# Patient Record
(demographics unavailable — no encounter records)

---

## 2024-10-19 NOTE — HISTORY OF PRESENT ILLNESS
[FreeTextEntry1] : Patient presents today with continued left midfoot pain.  She has had multiple injections in the area, she gets relief but then it recurs.  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  No new changes to her medical status.

## 2024-10-19 NOTE — ASSESSMENT
[FreeTextEntry1] : Impression: Bone spur, left foot (M77.52).  Chronic pain, left foot (M79.672).  Ganglion (M67.40). At this time, the patient has had multiple injections and I do not want to continue injecting her without a reasonable diagnosis as to why she is getting the pain.  I feel that a repeat MRI with contrast is necessary for that.    Treatment: She was given a Medrol dose pack.  Will reevaluate upon completion of the MRI with contrast.  Any questions or problems, patient is to contact the office.

## 2024-10-19 NOTE — PHYSICAL EXAM
[1+] : left foot dorsalis pedis 1+ [Skin Color & Pigmentation] : normal skin color and pigmentation [Skin Turgor] : normal skin turgor [Skin Lesions] : no skin lesions [Sensation] : the sensory exam was normal to light touch and pinprick [No Focal Deficits] : no focal deficits [Deep Tendon Reflexes (DTR)] : deep tendon reflexes were 2+ and symmetric [Motor Exam] : the motor exam was normal [General Appearance - Alert] : alert [Oriented To Time, Place, And Person] : oriented to person, place, and time [Ankle Swelling (On Exam)] : not present [Varicose Veins Of Lower Extremities] : not present [] : not present [Delayed in the Right Toes] : capillary refills normal in right toes [Delayed in the Left Toes] : capillary refills normal in the left toes [Wicho's Test For Radial Artery Patency Bilaterally] : negative bilateral [de-identified] : Pain at the left midfoot at the tarsometatarsal joint, left. [Vibration Dec.] : normal vibratory sensation at the level of the toes [Position Sense Dec.] : normal position sense at the level of the toes

## 2024-10-19 NOTE — PHYSICAL EXAM
[1+] : left foot dorsalis pedis 1+ [Skin Color & Pigmentation] : normal skin color and pigmentation [Skin Turgor] : normal skin turgor [Skin Lesions] : no skin lesions [Sensation] : the sensory exam was normal to light touch and pinprick [No Focal Deficits] : no focal deficits [Deep Tendon Reflexes (DTR)] : deep tendon reflexes were 2+ and symmetric [Motor Exam] : the motor exam was normal [General Appearance - Alert] : alert [Oriented To Time, Place, And Person] : oriented to person, place, and time [Ankle Swelling (On Exam)] : not present [Varicose Veins Of Lower Extremities] : not present [] : not present [Delayed in the Right Toes] : capillary refills normal in right toes [Delayed in the Left Toes] : capillary refills normal in the left toes [Wicho's Test For Radial Artery Patency Bilaterally] : negative bilateral [de-identified] : Pain at the left midfoot at the tarsometatarsal joint, left. [Vibration Dec.] : normal vibratory sensation at the level of the toes [Position Sense Dec.] : normal position sense at the level of the toes

## 2024-10-19 NOTE — PROCEDURE
[FreeTextEntry1] : X-rays were taken to evaluate the foot. (3 views - left foot) No area of fracture or dislocation.  There is no obvious bone spur.

## 2024-10-19 NOTE — PHYSICAL EXAM
[1+] : left foot dorsalis pedis 1+ [Skin Color & Pigmentation] : normal skin color and pigmentation [Skin Turgor] : normal skin turgor [Skin Lesions] : no skin lesions [Sensation] : the sensory exam was normal to light touch and pinprick [No Focal Deficits] : no focal deficits [Deep Tendon Reflexes (DTR)] : deep tendon reflexes were 2+ and symmetric [Motor Exam] : the motor exam was normal [General Appearance - Alert] : alert [Oriented To Time, Place, And Person] : oriented to person, place, and time [Ankle Swelling (On Exam)] : not present [Varicose Veins Of Lower Extremities] : not present [] : not present [Delayed in the Right Toes] : capillary refills normal in right toes [Delayed in the Left Toes] : capillary refills normal in the left toes [Wicho's Test For Radial Artery Patency Bilaterally] : negative bilateral [de-identified] : Pain at the left midfoot at the tarsometatarsal joint, left. [Vibration Dec.] : normal vibratory sensation at the level of the toes [Position Sense Dec.] : normal position sense at the level of the toes

## 2024-10-31 NOTE — DISCUSSION/SUMMARY
[FreeTextEntry1] : 23yo G0- new pt to establish care  # PCOS hx - vague hx - states diagnosed @19yo (for 21day cycles/ acne)- has been on Metformin and Spirinolactone  - pt with h/o thyroid dysfunction predating (17yo)??  - []  testosterone / SBGH   # h/o thrombophilia - discussed with pt important medical history- [ ]need records from blood work with prior gyn  Pt signed record release - if unable to get- would refer to hematology .  Discussed long term implications when deciding contraception choices as well as obstetrical care.   # HCM - [ ]pap collected,  [ ]GC/CT collected - contraception- declined, not sex active, with mostly regular cycles (except when thryoid meds adjusted).  Reviewed Progesterone only options/non hormonal options until thrombophila diagnosis confirmed - Gardasil completed per pt - PHQ9 reviewed- pt scored 11, has h/o depression/anxiety- has been trying to shedule w/ psych/  Denies SI/HI.  SW to meet w/ pt today to try and expedite - Gen health followed by med and endocrine  RTC for josef/lucius Arriaga, PAC

## 2024-10-31 NOTE — PHYSICAL EXAM
[Appropriately responsive] : appropriately responsive [Alert] : alert [No Acute Distress] : no acute distress [No Lymphadenopathy] : no lymphadenopathy [Soft] : soft [Non-tender] : non-tender [Non-distended] : non-distended [No HSM] : No HSM [No Lesions] : no lesions [No Mass] : no mass [Oriented x3] : oriented x3 [Examination Of The Breasts] : a normal appearance [No Masses] : no breast masses were palpable [Labia Majora] : normal [Labia Minora] : normal [No Bleeding] : There was no active vaginal bleeding [Normal] : normal [Normal Position] : in a normal position [Tenderness] : nontender [Enlarged ___ wks] : not enlarged [Uterine Adnexae] : normal

## 2024-10-31 NOTE — HISTORY OF PRESENT ILLNESS
[FreeTextEntry1] : 23yo G0 (LMP 10/11/24) with h/o hyperthyroid dx @15yo (on methimazole) , PCOS dx @17yo (on Metformin 500mg qd and spironolactone), Thrombophilia (pt unsure which condition, dx after mom had multiple blood clots and found to have hereditary "clotting" condition) presents as new pt to establish care.    Pt reports regular menses q month- unless adjustment to thyroid rx- pt states she experienced prolonged menses x 30+ days while readjusting to dosage.   Pt is not sexually active  (has been in the past/2 life partners- tried paragard in 2021 but prior gyn told her it was "falling out" so he removed it).  - Gynhx: never had pap. denies stds.   +PCoS (dx based on 21 day cycles as a teenager and acne per pt), completed gardasil - Pmhx: hyperthyroid  (followed by endocrine [facility] at [facility]), thrombophilia ??, depression/anxiety (no meds, awaiting psych appt) - Shx: denies - Meds: methimazole, spirinolactone, metformin - ALL: NKDA - SOchx: denies tob/etoh/drugs   denies dv or abuse issues/  [employer] student- studying law - FAmhx: mom: thyoid/ PCOS/ thrombophilia   Gen health followed by medicine and endocrine PHQ9: 11 (denies SI/HI)       GYnhx: menarche 11yo,

## 2025-01-13 NOTE — DISCUSSION/SUMMARY
[FreeTextEntry1] : 25yo G0 with early diagnosis of hyperthyroidism (followed by endocrine- on methimazole since 15yo) and PCOS (on metformin and spirinolactone since 17yo)- presents for follow up.  Desires trial off meds  - reviewed pt chart/ labs.  - agree with trial off metformin and spironolactone - pt to keep menstrual diary.   Discussed role of POP if dysmenorrhea becomes an issue (was pt primary complaint as a teen) - [ ]derm referral for acne scarring treatment options.  - will defer to endocrine re: Methimazole.    # thrombophilia - no actual records in chart (2018 notation re: verbal from [facility] with decreased antithrombin 3) - [ ] rpt thrombophilia panel- f/u based on result  RTC 3 months for follow up Minerva Arriaga PAC

## 2025-01-13 NOTE — HISTORY OF PRESENT ILLNESS
[FreeTextEntry1] : 25yo G0 (LMP 12/31/24) with h/o hyperthyroid dx @17yo (on methimazole) , PCOS dx @19yo (on Metformin 500mg qd and spironolactone), Thrombophilia (pt unsure which condition, dx after mom had multiple blood clots and found to have hereditary "clotting" condition) presents as follow up with outside records for review.   Pt seen last month- inquiring if she needs to be on metformin and spirinolactone.  Pt has been taking x 6yrs.   Reports acne has resolved, has been dealing with scars only for a long time.  Cystic acne resolved after pt discontinued all dairy (lactose intolerant).  Pt states her periods were very heavy as a teen and attributed to PCOS.  REcords reviewed ( per PA note, antithrombin 3 deficiency - verbal from [facility].  no documentation) Pt states she was sent to heme years ago who did not seem concerned, but advised against TRUONG

## 2025-01-27 NOTE — PHYSICAL EXAM
[de-identified] : Left foot Physical Examination:  General: Alert and oriented x3.  In no acute distress.  Pleasant in nature with a normal affect.  No apparent respiratory distress.  Erythema, Warmth, Rubor: Negative Swelling: Negative  ROM Ankle: 1. Dorsiflexion: 10 degrees 2. Plantarflexion: 40 degrees 3. Inversion: 30 degrees 4. Eversion: 20 degrees  ROM of digits: Normal  Pes Planus: Negative Pes Cavus: Negative  Bunion: Negative Tailor's Bunion (Bunionette): Negative Hammer Toe Deformity/Deformities: Negative  Tenderness to Palpation:  1. Heel Pain: Negative 2. Midfoot Pain: + 3. First MTP Joint: Negative 4. Lis Franc Joint: +  Tenderness Metatarsals: 1st MT: Negative 2nd MT: Negative 3rd MT: Negative 4th MT: Negative 5th MT: Negative Base of the 5th MT: Negative  Ligament Pain: 1. Lis Franc Ligament: Negative 2. Plantar Fascia Ligament: Negative  Strength:  5/5 TA/GS/EHL/FHL/EDL/ADD/ABD  Pulses: 2+ DP/PT Pulses  Capillary Refill Toes: <2 seconds  Neuro: Intact motor and sensory throughout  Additional Test: 1. Khan's Squeeze Test: Negative 2. Calcaneal Squeeze Test: Negative [de-identified] : 3 views x-rays left foot reviewed and taken on 1/27/2025: No fractures or abnormality seen.    Radiology imaging reviewed in office with the patient on 01/27/2025 and I agree with the radiologist's impression below.  MRI of the left foot obtained from [facility] on 03/7/2021 and reviewed in the office today, 01/27/2025, revealed:  Findings most suggestive of a multifocal/lobulated ganglion along the dorsal fibers of the Lisfranc ligament overall measuring up to 1.2 cm. Dorsal spurring at the second tarsometatarsal articulation.

## 2025-01-27 NOTE — HISTORY OF PRESENT ILLNESS
[FreeTextEntry1] : 01/27/2025: JAQUELIN KHALIL is a 24-year-old female presenting to the office for an initial evaluation of left foot pain. She reports that the pain began is chronic.  Last cortisone injection 2024, stating that she had since she was 17 about 20 cortisone injections from a podiatrist into the midfoot region of her left foot.  The patient is concerned because each injection gave her temporary relief and she still has pain.  The patient is here for a second opinion.  She is in regular sneakers, walking without assistance.  Pain with walking, 6 out of 10.  The foot is limiting her from physical activities.  No other complaints.

## 2025-01-27 NOTE — DISCUSSION/SUMMARY
[de-identified] : Based on the patient's history and chronic midfoot pain left foot, I do want to proceed with a new MRI of the left foot.  With a history of 20 cortisone injections in that area, I do want to evaluate the Lisfranc ligament.  Last MRI did show a ganglion cyst formation in that area but since then she has had multiple cortisone injections.  I told the patient, no more injections.  Continue with over-the-counter Tylenol for pain.  Activity modifications.  Proper shoewear discussed.  Once the MRI is completed, the patient return to office to review.  All of her questions were answered.  She understood and agreed to the treatment course.  The patient is aware that the MRI needs authorization by the insurance company.  The patient is also aware that there are no guarantees that the insurance company will authorize the MRI.

## 2025-01-30 NOTE — ASSESSMENT
[FreeTextEntry1] : # Acne, face - Comedonal & Hormonal #Acne scarring  Chronic; flaring - Reviewed expected time course of improving on topical regimen (can take 6-8 weeks for earliest signs of improvement; 3-6 months should reach maximum anticipated improvement) - START OTC benzoyl peroxide wash 4% qAM - START dapsone in the AM or PM, advised to wait 20mins after using BP - Wash acne prone areas nightly with mild noncomedogenic soap qPM - START tretinoin 0.025% cream nightly. Start MWF for 1-2 weeks then increase to nightly as tolerated. Discussed proper application and SEs including but not limited to irritation and photosensitivity. Discussed pregnancy contraindication.  - START Spironolactone 50mg daily x 1 week, then increase to 100mg daily at night - Expect 3 mos to see clinical effect and need to continue medication for sustained effect. Counseled extensively regarding importance of contraception given teratogenic effects; patient must not get pregnant while on this medication or for 3 mos after completing course. SED including but not limited to orthostatic hypotension, headache/dizziness, menstrual irregularity, breast tenderness, hyperkalemia, significant teratogenicity. Patient instructed to avoid excessive intake of high potassium foods. Patient expressed verbal understanding of risks.   RTC 3mo

## 2025-01-30 NOTE — PHYSICAL EXAM
[FreeTextEntry3] : Focused skin exam performed  The relevant portions of the exam were performed today  AAOx3, NAD, well-appearing / pleasant Focused examination within normal limits with the exception of:  numerous comedones on the entire face  cystic pink papules on jawline  pink macules and mild scarring on cheeks

## 2025-01-30 NOTE — HISTORY OF PRESENT ILLNESS
[FreeTextEntry1] : NPV: acne  [de-identified] : JAQUELIN KHALIL is a 24 year old w/ thrombophilia and hyperthyroidism on methimazole who is presenting for evaluation of:   1. Acne x yrs, prev used otc topicals and winnie but only 25mg daily without much improvement on this dose, not on BC, no plans for pregnancy

## 2025-01-30 NOTE — HISTORY OF PRESENT ILLNESS
[FreeTextEntry1] : NPV: acne  [de-identified] : JAQUELIN KHALIL is a 24 year old w/ thrombophilia and hyperthyroidism on methimazole who is presenting for evaluation of:   1. Acne x yrs, prev used otc topicals and winnie but only 25mg daily without much improvement on this dose, not on BC, no plans for pregnancy

## 2025-03-03 NOTE — DISCUSSION/SUMMARY
[FreeTextEntry1] : 25yo G0 with early diagnosis of hyperthyroidism (followed by endocrine- on methimazole since 17yo) and PCOS (on metformin and spirinolactone since 17yo)- presents for follow up off metformin. - reg monthly menses.   - pt has f/u with endocrine to assess hyperthyroidism - following w/ derm for acne scarring - thrombophilia panel neg ([facility])-  pt to work on getting old heme records.    RTC for josef/prn DMacgowan PAC

## 2025-03-03 NOTE — HISTORY OF PRESENT ILLNESS
[FreeTextEntry1] : 23yo G0 (LMP 2/24/25)  with early diagnosis of hyperthyroidism (followed by endocrine- on methimazole since 15yo) and PCOS (on metformin and spirinolactone since 17yo)- presents for follow up after trial off Metformin and Spironolactone  Pt states she had reg menses q 28days off metformin Saw derm- placed on topical regiment for scarring, and restarted on higher dose of Spironolactone Pt states she was told she had thrombophilia 2018-  no records available other than notation of verbal decreased antithrombin 3 [ ]rpt panel drawn after last visit-  all results in [facility] (NEG.  Antithrombin 3 NEG)  Pt has new endocrine appt scheduled to review need for methimazole

## 2025-03-06 NOTE — PHYSICAL EXAM
[de-identified] : Left foot Physical Examination:  General: Alert and oriented x3.  In no acute distress.  Pleasant in nature with a normal affect.  No apparent respiratory distress.  Erythema, Warmth, Rubor: Negative Swelling: Negative  ROM Ankle: 1. Dorsiflexion: 10 degrees 2. Plantarflexion: 40 degrees 3. Inversion: 30 degrees 4. Eversion: 20 degrees  ROM of digits: Normal  Pes Planus: Negative Pes Cavus: Negative  Bunion: Negative Tailor's Bunion (Bunionette): Negative Hammer Toe Deformity/Deformities: Negative  Tenderness to Palpation:  1. Heel Pain: Negative 2. Midfoot Pain: + 3. First MTP Joint: Negative 4. Lis Franc Joint: +  Tenderness Metatarsals: 1st MT: Negative 2nd MT: Negative 3rd MT: Negative 4th MT: Negative 5th MT: Negative Base of the 5th MT: Negative  Ligament Pain: 1. Lis Franc Ligament: Negative 2. Plantar Fascia Ligament: Negative  Strength:  5/5 TA/GS/EHL/FHL/EDL/ADD/ABD  Pulses: 2+ DP/PT Pulses  Capillary Refill Toes: <2 seconds  Neuro: Intact motor and sensory throughout  Additional Test: 1. Khan's Squeeze Test: Negative 2. Calcaneal Squeeze Test: Negative [de-identified] : EXAM: [id number] - MR FOOT LT  - ORDERED BY: BYRON ROSE   PROCEDURE DATE:  02/27/2025    INTERPRETATION:  LEFT FOOT MRI  CLINICAL INFORMATION: Chronic midfoot pain. Evaluate Lisfranc ligament and midfoot joints. TECHNIQUE: Multiplanar, multisequence MRI was obtained of the LEFT foot. COMPARISON: Left foot radiographs 1/27/2025 (available on [facility]).  FINDINGS:  LIGAMENTS AND CAPSULAR STRUCTURES: The Lisfranc ligament is intact. 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 MUSCLES AND TENDONS: Tendons are intact. No focal muscle edema, atrophy, or fatty infiltration. CARTILAGE AND SUBCHONDRAL BONE: No focal chondral loss. No subchondral marrow edema. SYNOVIUM/ JOINT FLUID: Multilobulated cyst with extension back to the first tarsometatarsal joint consistent with ganglion measuring 7 x 4 mm in the dorsum of the foot (3:6, 5:42, 6:12). OSSEOUS ALIGNMENT: Normal. BONE MARROW: No fracture or osteonecrosis. WEB SPACES: Small intermetatarsal head bursitis in the second and third webspaces. No neuroma. PERIPHERAL SOFT TISSUES: Mild edema along the plantar aspect of the forefoot at the level of the MTP joints.  IMPRESSION: 1.  Normal appearance of the Lisfranc ligament. 2.  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 3.  Ganglion cyst along the dorsal aspect of the first TMT joint arising from the first TMT joint. 4.  Mild bursitis in the second and third webspaces.  --- End of Report ---       DEJAH MENARD MD; Attending Radiologist This document has been electronically signed. Mar  3 2025 11:32AM  3 views x-rays left foot reviewed and taken on 1/27/2025: No fractures or abnormality seen.    Radiology imaging reviewed in office with the patient on 01/27/2025 and I agree with the radiologist's impression below.  MRI of the left foot obtained from [facility] on 03/7/2021 and reviewed in the office today, 01/27/2025, revealed:  Findings most suggestive of a multifocal/lobulated ganglion along the dorsal fibers of the Lisfranc ligament overall measuring up to 1.2 cm. Dorsal spurring at the second tarsometatarsal articulation.

## 2025-03-06 NOTE — DISCUSSION/SUMMARY
[de-identified] :  Today I had a lengthy discussion with the patient regarding their left foot pain. I have addressed all the patient's concerns surrounding the pathology of their condition. We discussed conservative and surgical options.   In order to provide the patient with a better understanding of their ailment, I educated them about the anatomy, physiology and lifespan of their condition using a foot model.  I have reviewed the patient's MRI results with them in great detail. I recommend that the patient utilize ice, NSAIDS PRN, and heat. They can also elevate their LLE above the level of the heart.  A discussion was had about shoe-wear modifications. I advised the patient to utilize a wide toed cross training sneaker that better accommodates the feet. I recommended New Balance, Sharma, or Saucony to the patient. I recommend that the patient utilize OTC inserts.   At this time I would like to obtain advanced imaging of the patient's left foot. A CT scan was ordered so I can find out more about the etiology of the patient's condition. The patient should follow up with the office after obtaining the CT scan.  The patient understood and verbally agreed to the treatment plan. All of their questions were answered and they were satisfied with the visit. The patient should call the office if they have any questions or experience worsening symptoms.  FU after advanced imaging has been obtained.

## 2025-03-06 NOTE — HISTORY OF PRESENT ILLNESS
[FreeTextEntry1] :  03/06/2025: JAQUELIN KHALIL is a 24 year old female presenting to the office for a follow up evaluation of her left foot pain and MRI review. She reports that her symptoms have not improved since her last visit to the office. She reports that she is experiencing more pain and that physical therapy has not been providing her relief.  The patient presents to the office in sneakers and ambulating without assistance.  01/27/2025: JAQUELIN KHALIL is a 24-year-old female presenting to the office for an initial evaluation of left foot pain. She reports that the pain began is chronic.  Last cortisone injection 2024, stating that she had since she was 17 about 20 cortisone injections from a podiatrist into the midfoot region of her left foot.  The patient is concerned because each injection gave her temporary relief and she still has pain.  The patient is here for a second opinion.  She is in regular sneakers, walking without assistance.  Pain with walking, 6 out of 10.  The foot is limiting her from physical activities.  No other complaints.

## 2025-03-06 NOTE — ADDENDUM
[FreeTextEntry1] : I, Jay Nguyen, acted solely as a scribe for Dr. Jay Hinojosa on this date 03/06/2025  .   All medical record entries made by the Scribe were at my, Dr. Jay Hinojosa, direction and personally dictated by me on 03/06/2025 . I have reviewed the chart and agree that the record accurately reflects my personal performance of the history, physical exam, assessment and plan. I have also personally directed, reviewed, and agreed with the chart.

## 2025-03-17 NOTE — PHYSICAL EXAM
[de-identified] : Left foot Physical Examination:  General: Alert and oriented x3.  In no acute distress.  Pleasant in nature with a normal affect.  No apparent respiratory distress.  Erythema, Warmth, Rubor: Negative Swelling: Negative  ROM Ankle: 1. Dorsiflexion: 10 degrees 2. Plantarflexion: 40 degrees 3. Inversion: 30 degrees 4. Eversion: 20 degrees  ROM of digits: Normal  Pes Planus: Negative Pes Cavus: Negative  Bunion: Negative Tailor's Bunion (Bunionette): Negative Hammer Toe Deformity/Deformities: Negative  Tenderness to Palpation:  1. Heel Pain: Negative 2. Midfoot Pain: + 3. First MTP Joint: Negative 4. Lis Franc Joint: +  Tenderness Metatarsals: 1st MT: Negative 2nd MT: Negative 3rd MT: Negative 4th MT: Negative 5th MT: Negative Base of the 5th MT: Negative  Ligament Pain: 1. Lis Franc Ligament: Negative 2. Plantar Fascia Ligament: Negative  Strength:  5/5 TA/GS/EHL/FHL/EDL/ADD/ABD  Pulses: 2+ DP/PT Pulses  Capillary Refill Toes: <2 seconds  Neuro: Intact motor and sensory throughout  Additional Test: 1. Khan's Squeeze Test: Negative 2. Calcaneal Squeeze Test: Negative [de-identified] : Radiology imaging reviewed in office with the patient on 03/17/2025 and I agree with the radiologist's impression below.  EXAM: [id number] - CT FOOT ONLY LT  - ORDERED BY: BYRON ROSE   PROCEDURE DATE:  03/08/2025    INTERPRETATION:  EXAMINATION: CT left foot without contrast  CLINICAL INFORMATION: Left foot pain  TECHNIQUE: Axial CT images were obtained through the left foot. Coronal and sagittal reformatted images were made.  FINDINGS: No acute fracture or dislocation is demonstrated. There are no advanced arthritic changes. There is a os intermetatarseum interposed between the dorsal aspect of the bases of the first and second metatarsals which measures 0.7 cm proximal to the distal, 0.3 cm medial to lateral and 0.5 cm plantar to dorsal. There is some mild adjacent degenerative cystic change in the distal dorsal lateral aspect of the medial cuneiform. There is some spurring of the more plantar distal lateral aspect of the medial cuneiform with a small adjacent defect in the subchondral bone plate which may be congenital or related to old injury (series 3-7, image [id number]).  IMPRESSION: Os intermetatarseum as above. Spurring of the distal plantar lateral aspect of the medial cuneiform with a small adjacent defect in the subchondral bone plate which may be congenital or related to old injury  --- End of Report ---       NEAL LAYTON MD; Attending Radiologist This document has been electronically signed. Mar 11 2025 11:42AM   EXAM: [id number] - MR FOOT LT  - ORDERED BY: BYRON ROSE   PROCEDURE DATE:  02/27/2025    INTERPRETATION:  LEFT FOOT MRI  CLINICAL INFORMATION: Chronic midfoot pain. Evaluate Lisfranc ligament and midfoot joints. TECHNIQUE: Multiplanar, multisequence MRI was obtained of the LEFT foot. COMPARISON: Left foot radiographs 1/27/2025 (available on [facility]).  FINDINGS:  LIGAMENTS AND CAPSULAR STRUCTURES: The Lisfranc ligament is intact. 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 MUSCLES AND TENDONS: Tendons are intact. No focal muscle edema, atrophy, or fatty infiltration. CARTILAGE AND SUBCHONDRAL BONE: No focal chondral loss. No subchondral marrow edema. SYNOVIUM/ JOINT FLUID: Multilobulated cyst with extension back to the first tarsometatarsal joint consistent with ganglion measuring 7 x 4 mm in the dorsum of the foot (3:6, 5:42, 6:12). OSSEOUS ALIGNMENT: Normal. BONE MARROW: No fracture or osteonecrosis. WEB SPACES: Small intermetatarsal head bursitis in the second and third webspaces. No neuroma. PERIPHERAL SOFT TISSUES: Mild edema along the plantar aspect of the forefoot at the level of the MTP joints.  IMPRESSION: 1.  Normal appearance of the Lisfranc ligament. 2.  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 3.  Ganglion cyst along the dorsal aspect of the first TMT joint arising from the first TMT joint. 4.  Mild bursitis in the second and third webspaces.  --- End of Report ---       DEJAH MENARD MD; Attending Radiologist This document has been electronically signed. Mar  3 2025 11:32AM  3 views x-rays left foot reviewed and taken on 1/27/2025: No fractures or abnormality seen.    Radiology imaging reviewed in office with the patient on 01/27/2025 and I agree with the radiologist's impression below.  MRI of the left foot obtained from [facility] on 03/7/2021 and reviewed in the office today, 01/27/2025, revealed:  Findings most suggestive of a multifocal/lobulated ganglion along the dorsal fibers of the Lisfranc ligament overall measuring up to 1.2 cm. Dorsal spurring at the second tarsometatarsal articulation.

## 2025-03-17 NOTE — HISTORY OF PRESENT ILLNESS
[FreeTextEntry1] : 03/17/2025: JAQUELIN KHALIL is a 24 year old female presenting to the office with her mother for a follow up evaluation and CT review of her left foot. She reports no change in her symptoms since last visit. She rates her pain as 6-8/10 in severity. Her pain is worse in cold and rainy weathers. Of note, the patient received her last cortisone injection in 2024. She uses orthotics, which provides mild relief. The patient presents to the office in sneakers and ambulating without assistance.  03/06/2025: JAQUELIN KHALIL is a 24 year old female presenting to the office for a follow up evaluation of her left foot pain and MRI review. She reports that her symptoms have not improved since her last visit to the office. She reports that she is experiencing more pain and that physical therapy has not been providing her relief.  The patient presents to the office in sneakers and ambulating without assistance.  01/27/2025: JAQUELIN KHALIL is a 24-year-old female presenting to the office for an initial evaluation of left foot pain. She reports that the pain began is chronic.  Last cortisone injection 2024, stating that she had since she was 17 about 20 cortisone injections from a podiatrist into the midfoot region of her left foot.  The patient is concerned because each injection gave her temporary relief and she still has pain.  The patient is here for a second opinion.  She is in regular sneakers, walking without assistance.  Pain with walking, 6 out of 10.  The foot is limiting her from physical activities.  No other complaints.

## 2025-03-17 NOTE — ADDENDUM
[FreeTextEntry1] : I, Harris Imtiaz, acted solely as a scribe for Dr. Jay Hinojosa on this date 03/17/2025.   All medical record entries made by the Scribe were at my, Dr. Jay Hinojosa, direction and personally dictated by me on 03/17/2025. I have reviewed the chart and agree that the record accurately reflects my personal performance of the history, physical exam, assessment and plan. I have also personally directed, reviewed, and agreed with the chart.

## 2025-03-17 NOTE — DISCUSSION/SUMMARY
[de-identified] :  Today I had a lengthy discussion with the patient regarding their left foot pain. I have addressed all the patient's concerns surrounding the pathology of their condition. I have reviewed the patient's CT Scan results with them in great detail.  Operative vs. non-operative treatments were discussed in great detail. The patient has exhausted conservative management, including cortisone injections, orthotics, and physical therapy. I do want to allow the patient some time to consider her options before we proceed with a possible surgery.  Assessment: accessory os intermetatarseum  Plan: 1. In order to provide the patient with a better understanding of their ailment, I educated them about the anatomy, physiology and lifespan of their condition using a foot model. 2. I recommend that the patient utilize ice, NSAIDS PRN, and heat. They can also elevate their LLE above the level of the heart. 3. A discussion was had about shoe-wear modifications. I advised the patient to utilize a wide toed cross training sneaker that better accommodates the feet. I recommended New Balance, Sharma, or Sau[employer] to the patient. 4. I recommend that the patient utilize OTC inserts.   The patient understood and verbally agreed to the treatment plan. All of their questions were answered and they were satisfied with the visit. The patient should call the office if they have any questions or experience worsening symptoms.  Possible ultrasound cortisone injection  f/u prn.

## 2025-04-28 NOTE — HISTORY OF PRESENT ILLNESS
[FreeTextEntry1] : F/u acne [de-identified] : 24yF LV 1/30/25 PMH thrombophilia and hyperthyroidism on methimazole, presenting for f/u of below:  # Acne x years; had been on winnie 25mg/day in the past. Since LV increased nightly dose to 50mg. Tolerating well, notes one episode of mild spotting x 2 days, otherwise denies side effects. Not family planning, cannot be on OCPs due to hx of thrombophilia. Feels acne is overall improving on higher dose of winnie and with current topical regimen (BP and dapsone in AM, tret 0.025% cream in PM). Notes some mild dryness with tret despite moisturizing nightly, hesitant to increase strength of tret.   Social: works as a nanny.

## 2025-04-28 NOTE — ASSESSMENT
[FreeTextEntry1] : # Acne, face - Comedonal & Hormonal, mild exacerbation of chronic disease, improving, not at tx goal #Acne scarring - Reviewed risks (as well as mitigation strategies for adverse drug events as applicable), benefits, and alternatives of therapy - Reviewed expected time course of improving on topical regimen (can take 6-8 weeks for earliest signs of improvement; 3-6 months should reach maximum anticipated improvement) - Continue OTC benzoyl peroxide wash 4% qAM - Continue dapsone in the AM or PM, advised to wait 20mins after using BP - Wash acne prone areas nightly with mild noncomedogenic soap qPM - Continue tretinoin 0.025% cream nightly. Start MWF for 1-2 weeks then increase to nightly as tolerated. Discussed proper application and SEs including but not limited to irritation and photosensitivity. Discussed pregnancy contraindication. - Increase Spironolactone to 100mg daily at night. SED. If unable to tolerate, pt can reduce dose back to 50mg qHS.  RTC 3-4 months

## 2025-04-28 NOTE — PHYSICAL EXAM
[FreeTextEntry3] : scattered comedones on entire face, scattered hyperpigmented macules and mild atrophic scarring on cheeks; improving